# Patient Record
Sex: FEMALE | Race: BLACK OR AFRICAN AMERICAN | NOT HISPANIC OR LATINO | Employment: FULL TIME | URBAN - METROPOLITAN AREA
[De-identification: names, ages, dates, MRNs, and addresses within clinical notes are randomized per-mention and may not be internally consistent; named-entity substitution may affect disease eponyms.]

---

## 2019-08-13 ENCOUNTER — OFFICE VISIT (OUTPATIENT)
Dept: URGENT CARE | Facility: CLINIC | Age: 34
End: 2019-08-13
Payer: MEDICAID

## 2019-08-13 VITALS
TEMPERATURE: 99 F | WEIGHT: 177 LBS | HEART RATE: 101 BPM | RESPIRATION RATE: 16 BRPM | HEIGHT: 66 IN | DIASTOLIC BLOOD PRESSURE: 78 MMHG | OXYGEN SATURATION: 100 % | BODY MASS INDEX: 28.45 KG/M2 | SYSTOLIC BLOOD PRESSURE: 116 MMHG

## 2019-08-13 DIAGNOSIS — M54.50 ACUTE MIDLINE LOW BACK PAIN WITHOUT SCIATICA: Primary | ICD-10-CM

## 2019-08-13 PROCEDURE — 72100 XR LUMBAR SPINE AP AND LATERAL: ICD-10-PCS | Mod: S$GLB,,, | Performed by: RADIOLOGY

## 2019-08-13 PROCEDURE — 99203 OFFICE O/P NEW LOW 30 MIN: CPT | Mod: S$GLB,,, | Performed by: NURSE PRACTITIONER

## 2019-08-13 PROCEDURE — 72100 X-RAY EXAM L-S SPINE 2/3 VWS: CPT | Mod: S$GLB,,, | Performed by: RADIOLOGY

## 2019-08-13 PROCEDURE — 99203 PR OFFICE/OUTPT VISIT, NEW, LEVL III, 30-44 MIN: ICD-10-PCS | Mod: S$GLB,,, | Performed by: NURSE PRACTITIONER

## 2019-08-13 RX ORDER — DICLOFENAC SODIUM 75 MG/1
75 TABLET, DELAYED RELEASE ORAL 2 TIMES DAILY WITH MEALS
Qty: 60 TABLET | Refills: 0 | Status: SHIPPED | OUTPATIENT
Start: 2019-08-14 | End: 2019-09-10

## 2019-08-13 RX ORDER — KETOROLAC TROMETHAMINE 30 MG/ML
30 INJECTION, SOLUTION INTRAMUSCULAR; INTRAVENOUS
Status: COMPLETED | OUTPATIENT
Start: 2019-08-13 | End: 2019-08-13

## 2019-08-13 RX ORDER — METHOCARBAMOL 750 MG/1
750 TABLET, FILM COATED ORAL 4 TIMES DAILY
Qty: 40 TABLET | Refills: 0 | Status: SHIPPED | OUTPATIENT
Start: 2019-08-13 | End: 2019-08-23

## 2019-08-13 RX ADMIN — KETOROLAC TROMETHAMINE 30 MG: 30 INJECTION, SOLUTION INTRAMUSCULAR; INTRAVENOUS at 12:08

## 2019-08-13 NOTE — PROGRESS NOTES
"Subjective:       Patient ID: Faby Luz is a 33 y.o. female.    Vitals:  height is 5' 6" (1.676 m) and weight is 80.3 kg (177 lb). Her temperature is 98.8 °F (37.1 °C). Her blood pressure is 116/78 and her pulse is 101. Her respiration is 16 and oxygen saturation is 100%.     Chief Complaint: Back Pain    Pt states she notice lower back pain starting 3 weeks ago. Pt states the pain has gotten worse over the last couple of day.    Back Pain   This is a new problem. Episode onset: 3 weeks  The problem occurs constantly. The problem has been rapidly worsening since onset. The quality of the pain is described as aching. The pain does not radiate. The pain is at a severity of 9/10. The pain is severe. The pain is the same all the time. The symptoms are aggravated by sitting, lying down, position, bending, twisting, standing and urinating. Pertinent negatives include no abdominal pain, bladder incontinence, bowel incontinence, dysuria or numbness. She has tried nothing for the symptoms. The treatment provided no relief.       Constitution: Negative for fatigue.   Gastrointestinal: Negative for abdominal pain and bowel incontinence.   Genitourinary: Negative for dysuria, urgency, bladder incontinence and hematuria.   Musculoskeletal: Positive for back pain. Negative for muscle cramps and history of spine disorder.   Skin: Negative for rash.   Neurological: Negative for coordination disturbances, numbness and tingling.       Objective:      Physical Exam   Constitutional: She is oriented to person, place, and time. She appears well-developed and well-nourished. She is cooperative.  Non-toxic appearance. She does not appear ill. No distress.   HENT:   Head: Normocephalic and atraumatic.   Right Ear: Hearing, tympanic membrane, external ear and ear canal normal.   Left Ear: Hearing, tympanic membrane, external ear and ear canal normal.   Nose: Nose normal. No mucosal edema, rhinorrhea or nasal deformity. No epistaxis. " Right sinus exhibits no maxillary sinus tenderness and no frontal sinus tenderness. Left sinus exhibits no maxillary sinus tenderness and no frontal sinus tenderness.   Mouth/Throat: Uvula is midline, oropharynx is clear and moist and mucous membranes are normal. No trismus in the jaw. Normal dentition. No uvula swelling. No posterior oropharyngeal erythema.   Eyes: Conjunctivae and lids are normal. Right eye exhibits no discharge. Left eye exhibits no discharge. No scleral icterus.   Sclera clear bilat   Neck: Trachea normal, normal range of motion, full passive range of motion without pain and phonation normal. Neck supple.   Cardiovascular: Normal rate, regular rhythm, normal heart sounds, intact distal pulses and normal pulses.   Pulmonary/Chest: Effort normal and breath sounds normal. No respiratory distress.   Abdominal: Soft. Normal appearance and bowel sounds are normal. She exhibits no distension, no pulsatile midline mass and no mass. There is no tenderness.   Musculoskeletal: Normal range of motion. She exhibits no edema or deformity.        Lumbar back: She exhibits tenderness, pain and spasm. She exhibits no bony tenderness.   Neurological: She is alert and oriented to person, place, and time. She exhibits normal muscle tone. Coordination normal.   Skin: Skin is warm, dry and intact. She is not diaphoretic. No pallor.   Psychiatric: She has a normal mood and affect. Her speech is normal and behavior is normal. Judgment and thought content normal. Cognition and memory are normal.   Nursing note and vitals reviewed.      Assessment:       1. Acute midline low back pain without sciatica        Plan:         Acute midline low back pain without sciatica  -     ketorolac injection 30 mg  -     X-Ray Lumbar Spine Ap And Lateral; Future; Expected date: 08/13/2019  -     diclofenac (VOLTAREN) 75 MG EC tablet; Take 1 tablet (75 mg total) by mouth 2 (two) times daily with meals.  Dispense: 60 tablet; Refill: 0  -      methocarbamol (ROBAXIN) 750 MG Tab; Take 1 tablet (750 mg total) by mouth 4 (four) times daily. for 10 days  Dispense: 40 tablet; Refill: 0      Results for orders placed or performed during the hospital encounter of 08/06/13   POCT urine pregnancy   Result Value Ref Range    POC Preg Test, Ur Negative Negative     Acceptable Yes (none)     Please follow up with your Primary care provider within 2-5 days if your signs and symptoms have not resolved or worsen.     If your condition worsens or fails to improve we recommend that you receive another evaluation at the emergency room immediately or contact your primary medical clinic to discuss your concerns.   You must understand that you have received an Urgent Care treatment only and that you may be released before all of your medical problems are known or treated. You, the patient, will arrange for follow up care as instructed.     RED FLAGS/WARNING SYMPTOMS DISCUSSED WITH PATIENT THAT WOULD WARRANT EMERGENT MEDICAL ATTENTION. PATIENT VERBALIZED UNDERSTANDING.       Back Pain (Acute or Chronic)    Back pain is one of the most common problems. The good news is that most people feel better in 1 to 2 weeks, and most of the rest in 1 to 2 months. Most people can remain active.  People experience and describe pain differently; not everyone is the same.  · The pain can be sharp, stabbing, shooting, aching, cramping or burning.  · Movement, standing, bending, lifting, sitting, or walking may worsen pain.  · It can be localized to one spot or area, or it can be more generalized.  · It can spread or radiate upwards, to the front, or go down your arms or legs (sciatica).  · It can cause muscle spasm.  Most of the time, mechanical problems with the muscles or spine cause the pain. Mechanical problems are usually caused by an injury to the muscles or ligaments. While illness can cause back pain, it is usually not caused by a serious illness. Mechanical problems  include:   · Physical activity such as sports, exercise, work, or normal activity  · Overexertion, lifting, pushing, pulling incorrectly or too aggressively  · Sudden twisting, bending, or stretching from an accident, or accidental movement  · Poor posture  · Stretching or moving wrong, without noticing pain at the time  · Poor coordination, lack of regular exercise (check with your doctor about this)  · Spinal disc disease or arthritis  · Stress  Pain can also be related to pregnancy, or illness like appendicitis, bladder or kidney infections, pelvic infections, and many other things.  Acute back pain usually gets better in 1 to 2 weeks. Back pain related to disk disease, arthritis in the spinal joints or spinal stenosis (narrowing of the spinal canal) can become chronic and last for months or years.  Unless you had a physical injury (for example, a car accident or fall) X-rays are usually not needed for the initial evaluation of back pain. If pain continues and does not respond to medical treatment, X-rays and other tests may be needed.  Home care  Try these home care recommendations:  · When in bed, try to find a position of comfort. A firm mattress is best. Try lying flat on your back with pillows under your knees. You can also try lying on your side with your knees bent up towards your chest and a pillow between your knees.  · At first, do not try to stretch out the sore spots. If there is a strain, it is not like the good soreness you get after exercising without an injury. In this case, stretching may make it worse.  · Avoid prolong sitting, long car rides, or travel. This puts more stress on the lower back than standing or walking.  · During the first 24 to 72 hours after an acute injury or flare up of chronic back pain, apply an ice pack to the painful area for 20 minutes and then remove it for 20 minutes. Do this over a period of 60 to 90 minutes or several times a day. This will reduce swelling and pain.  Wrap the ice pack in a thin towel or plastic to protect your skin.  · You can start with ice, then switch to heat. Heat (hot shower, hot bath, or heating pad) reduces pain and works well for muscle spasms. Heat can be applied to the painful area for 20 minutes then remove it for 20 minutes. Do this over a period of 60 to 90 minutes or several times a day. Do not sleep on a heating pad. It can lead to skin burns or tissue damage.  · You can alternate ice and heat therapy. Talk with your doctor about the best treatment for your back pain.  · Therapeutic massage can help relax the back muscles without stretching them.  · Be aware of safe lifting methods and do not lift anything without stretching first.  Medicines  Talk to your doctor before using medicine, especially if you have other medical problems or are taking other medicines.  · You may use over-the-counter medicine as directed on the bottle to control pain, unless another pain medicine was prescribed. If you have chronic conditions like diabetes, liver or kidney disease, stomach ulcers, or gastrointestinal bleeding, or are taking blood thinners, talk to your doctor before taking any medicine.  · Be careful if you are given a prescription medicines, narcotics, or medicine for muscle spasms. They can cause drowsiness, affect your coordination, reflexes, and judgement. Do not drive or operate heavy machinery.  Follow-up care  Follow up with your healthcare provider, or as advised.   A radiologist will review any X-rays that were taken. Your provide will notify you of any new findings that may affect your care.  Call 911  Call emergency services if any of the following occur:  · Trouble breathing  · Confusion  · Very drowsy or trouble awakening  · Fainting or loss of consciousness  · Rapid or very slow heart rate  · Loss of bowel or bladder control  When to seek medical advice  Call your healthcare provider right away if any of these occur:   · Pain becomes worse  or spreads to your legs  · Weakness or numbness in one or both legs  · Numbness in the groin or genital area  Date Last Reviewed: 7/1/2016 © 2000-2017 [employer] [address] [place] [address], [place] [address]. All rights reserved. This information is not intended as a substitute for professional medical care. Always follow your healthcare professional's instructions.

## 2019-08-13 NOTE — PATIENT INSTRUCTIONS
Please follow up with your Primary care provider within 2-5 days if your signs and symptoms have not resolved or worsen.     If your condition worsens or fails to improve we recommend that you receive another evaluation at the emergency room immediately or contact your primary medical clinic to discuss your concerns.   You must understand that you have received an Urgent Care treatment only and that you may be released before all of your medical problems are known or treated. You, the patient, will arrange for follow up care as instructed.     RED FLAGS/WARNING SYMPTOMS DISCUSSED WITH PATIENT THAT WOULD WARRANT EMERGENT MEDICAL ATTENTION. PATIENT VERBALIZED UNDERSTANDING.       Back Pain (Acute or Chronic)    Back pain is one of the most common problems. The good news is that most people feel better in 1 to 2 weeks, and most of the rest in 1 to 2 months. Most people can remain active.  People experience and describe pain differently; not everyone is the same.  · The pain can be sharp, stabbing, shooting, aching, cramping or burning.  · Movement, standing, bending, lifting, sitting, or walking may worsen pain.  · It can be localized to one spot or area, or it can be more generalized.  · It can spread or radiate upwards, to the front, or go down your arms or legs (sciatica).  · It can cause muscle spasm.  Most of the time, mechanical problems with the muscles or spine cause the pain. Mechanical problems are usually caused by an injury to the muscles or ligaments. While illness can cause back pain, it is usually not caused by a serious illness. Mechanical problems include:   · Physical activity such as sports, exercise, work, or normal activity  · Overexertion, lifting, pushing, pulling incorrectly or too aggressively  · Sudden twisting, bending, or stretching from an accident, or accidental movement  · Poor posture  · Stretching or moving wrong, without noticing pain at the time  · Poor coordination, lack of regular  exercise (check with your doctor about this)  · Spinal disc disease or arthritis  · Stress  Pain can also be related to pregnancy, or illness like appendicitis, bladder or kidney infections, pelvic infections, and many other things.  Acute back pain usually gets better in 1 to 2 weeks. Back pain related to disk disease, arthritis in the spinal joints or spinal stenosis (narrowing of the spinal canal) can become chronic and last for months or years.  Unless you had a physical injury (for example, a car accident or fall) X-rays are usually not needed for the initial evaluation of back pain. If pain continues and does not respond to medical treatment, X-rays and other tests may be needed.  Home care  Try these home care recommendations:  · When in bed, try to find a position of comfort. A firm mattress is best. Try lying flat on your back with pillows under your knees. You can also try lying on your side with your knees bent up towards your chest and a pillow between your knees.  · At first, do not try to stretch out the sore spots. If there is a strain, it is not like the good soreness you get after exercising without an injury. In this case, stretching may make it worse.  · Avoid prolong sitting, long car rides, or travel. This puts more stress on the lower back than standing or walking.  · During the first 24 to 72 hours after an acute injury or flare up of chronic back pain, apply an ice pack to the painful area for 20 minutes and then remove it for 20 minutes. Do this over a period of 60 to 90 minutes or several times a day. This will reduce swelling and pain. Wrap the ice pack in a thin towel or plastic to protect your skin.  · You can start with ice, then switch to heat. Heat (hot shower, hot bath, or heating pad) reduces pain and works well for muscle spasms. Heat can be applied to the painful area for 20 minutes then remove it for 20 minutes. Do this over a period of 60 to 90 minutes or several times a day. Do  not sleep on a heating pad. It can lead to skin burns or tissue damage.  · You can alternate ice and heat therapy. Talk with your doctor about the best treatment for your back pain.  · Therapeutic massage can help relax the back muscles without stretching them.  · Be aware of safe lifting methods and do not lift anything without stretching first.  Medicines  Talk to your doctor before using medicine, especially if you have other medical problems or are taking other medicines.  · You may use over-the-counter medicine as directed on the bottle to control pain, unless another pain medicine was prescribed. If you have chronic conditions like diabetes, liver or kidney disease, stomach ulcers, or gastrointestinal bleeding, or are taking blood thinners, talk to your doctor before taking any medicine.  · Be careful if you are given a prescription medicines, narcotics, or medicine for muscle spasms. They can cause drowsiness, affect your coordination, reflexes, and judgement. Do not drive or operate heavy machinery.  Follow-up care  Follow up with your healthcare provider, or as advised.   A radiologist will review any X-rays that were taken. Your provide will notify you of any new findings that may affect your care.  Call 911  Call emergency services if any of the following occur:  · Trouble breathing  · Confusion  · Very drowsy or trouble awakening  · Fainting or loss of consciousness  · Rapid or very slow heart rate  · Loss of bowel or bladder control  When to seek medical advice  Call your healthcare provider right away if any of these occur:   · Pain becomes worse or spreads to your legs  · Weakness or numbness in one or both legs  · Numbness in the groin or genital area  Date Last Reviewed: 7/1/2016 © 2000-2017 [employer]. [address] [place] [address], [place] [address]. All rights reserved. This information is not intended as a substitute for professional medical care. Always follow your healthcare  professional's instructions.

## 2019-09-10 ENCOUNTER — OFFICE VISIT (OUTPATIENT)
Dept: OBSTETRICS AND GYNECOLOGY | Facility: CLINIC | Age: 34
End: 2019-09-10
Payer: MEDICAID

## 2019-09-10 VITALS
SYSTOLIC BLOOD PRESSURE: 108 MMHG | HEIGHT: 66 IN | WEIGHT: 170.88 LBS | BODY MASS INDEX: 27.46 KG/M2 | DIASTOLIC BLOOD PRESSURE: 66 MMHG

## 2019-09-10 DIAGNOSIS — Z30.09 FAMILY PLANNING: Primary | ICD-10-CM

## 2019-09-10 PROCEDURE — 99213 OFFICE O/P EST LOW 20 MIN: CPT | Mod: PBBFAC | Performed by: OBSTETRICS & GYNECOLOGY

## 2019-09-10 PROCEDURE — 99203 PR OFFICE/OUTPT VISIT, NEW, LEVL III, 30-44 MIN: ICD-10-PCS | Mod: S$PBB,,, | Performed by: OBSTETRICS & GYNECOLOGY

## 2019-09-10 PROCEDURE — 99203 OFFICE O/P NEW LOW 30 MIN: CPT | Mod: S$PBB,,, | Performed by: OBSTETRICS & GYNECOLOGY

## 2019-09-10 PROCEDURE — 99999 PR PBB SHADOW E&M-EST. PATIENT-LVL III: CPT | Mod: PBBFAC,,, | Performed by: OBSTETRICS & GYNECOLOGY

## 2019-09-10 PROCEDURE — 99999 PR PBB SHADOW E&M-EST. PATIENT-LVL III: ICD-10-PCS | Mod: PBBFAC,,, | Performed by: OBSTETRICS & GYNECOLOGY

## 2019-09-10 NOTE — LETTER
September 10, 2019      Jose STOCK Age, APRN  [address] [facility] [address]           [facility]  [address] [facility] [address]  [facility] [address]  Phone: [phone]          Patient: Faby Luz   MR Number: [id number]   YOB: 1985   Date of Visit: 9/10/2019       Dear Kurtiskaty STOCK Age:    Thank you for referring Faby Luz to me for evaluation. Attached you will find relevant portions of my assessment and plan of care.    If you have questions, please do not hesitate to call me. I look forward to following Faby Luz along with you.    Sincerely,    Jem Espinal MD    Enclosure  CC:  No Recipients    If you would like to receive this communication electronically, please contact [email or web address] or [phone] to request more information on [employer] Link access.    For providers and/or their staff who would like to refer a patient to [facility], please contact us through our one-stop-shop provider referral line, [facility], at [phone].    If you feel you have received this communication in error or would no longer like to receive these types of communications, please e-mail [email or web address]

## 2019-09-10 NOTE — PROGRESS NOTES
Subjective:       Patient ID: Faby Luz is a 33 y.o. female.    Chief Complaint:  Gynecologic Exam (NP here for pap and consult on tubal ligation)      History of Present Illness  HPI  Patient comes in today requesting tubal ligation/family planning  No complaint    Menses started today .  Does not want to be examined.      GYN & OB History  Patient's last menstrual period was 2019 (exact date).   Date of Last Pap: No result found    OB History    Para Term  AB Living   4 3 3   1 3   SAB TAB Ectopic Multiple Live Births           3      # Outcome Date GA Lbr Andry/2nd Weight Sex Delivery Anes PTL Lv   4 AB 19           3 Term 10/02/18 40w0d  3.629 kg (8 lb) M Vag-Spont EPI N JEANNETTE   2 Term 06/25/10 40w0d  3.459 kg (7 lb 10 oz) M Vag-Spont EPI N JEANNETTE   1 Term 07 40w0d  3.515 kg (7 lb 12 oz) M Vag-Spont  N JEANNETTE     History reviewed. No pertinent past medical history.    History reviewed. No pertinent surgical history.    Family History   Problem Relation Age of Onset    Diabetes Mother     Diabetes Maternal Grandmother     Hypertension Maternal Grandmother     Diabetes Maternal Aunt     Kidney disease Maternal Aunt        Social History     Socioeconomic History    Marital status: Single     Spouse name: Not on file    Number of children: Not on file    Years of education: Not on file    Highest education level: Not on file   Occupational History    Not on file   Social Needs    Financial resource strain: Not on file    Food insecurity:     Worry: Not on file     Inability: Not on file    Transportation needs:     Medical: Not on file     Non-medical: Not on file   Tobacco Use    Smoking status: Never Smoker    Smokeless tobacco: Never Used   Substance and Sexual Activity    Alcohol use: Yes     Comment: occ-social    Drug use: No    Sexual activity: Yes     Birth control/protection: None   Lifestyle    Physical activity:     Days per week: Not on file     Minutes per  session: Not on file    Stress: Not on file   Relationships    Social connections:     Talks on phone: Not on file     Gets together: Not on file     Attends [employer] service: Not on file     Active member of club or organization: Not on file     Attends meetings of clubs or organizations: Not on file     Relationship status: Not on file   Other Topics Concern    Not on file   Social History Narrative    Together since high school     2013,   2015.  Back together 2017.    He is a     She is a hotel exec.        No current outpatient medications on file.     No current facility-administered medications for this visit.        Review of patient's allergies indicates:  No Known Allergies    Review of Systems  Review of Systems   Constitutional: Negative for activity change, appetite change, chills, fatigue, fever and unexpected weight change.   HENT: Negative for mouth sores.    Respiratory: Negative for cough, shortness of breath and wheezing.    Cardiovascular: Negative for chest pain and palpitations.   Gastrointestinal: Negative for abdominal pain, bloating, blood in stool, constipation, nausea and vomiting.   Endocrine: Negative for diabetes and hot flashes.   Genitourinary: Negative for dysmenorrhea, dyspareunia, dysuria, frequency, hematuria, menorrhagia, menstrual problem, pelvic pain, urgency, vaginal bleeding, vaginal discharge, vaginal pain, urinary incontinence, postcoital bleeding and vaginal odor.   Musculoskeletal: Negative for back pain and myalgias.   Integumentary:  Negative for rash, breast mass and nipple discharge.   Neurological: Negative for seizures and headaches.   Psychiatric/Behavioral: Negative for depression and sleep disturbance. The patient is not nervous/anxious.    Breast: Negative for mass, mastodynia and nipple discharge          Objective:    Physical Exam:   Constitutional: She appears well-developed and well-nourished. No distress.    HENT:   Head:  Normocephalic and atraumatic.    Eyes: EOM are normal.    Neck: Normal range of motion.    Cardiovascular: Normal rate.     Pulmonary/Chest: Effort normal. No respiratory distress.                  Musculoskeletal: Normal range of motion.       Neurological: She is alert.    Skin: Skin is warm and dry.    Psychiatric: She has a normal mood and affect.          Assessment:        1. Family planning              Plan:      I have discussed with the patient regarding her contraceptive options.  Risks and benefits of all discussed including oral contraceptives, Depo-Provera, OrthoEvra, NuvaRing, Mirena/ParaGard, Implanon, sterilization. After extensive dicussion, the patient wishes to have the Mirena.  Risks and benefits again discussed.  All of her questions were answered appropriately to her satisfaction.        We will order the device  She will be back for insertion.    She also wants to discuss tubal ligation.  Medicaid tubal consent signed.

## 2019-09-20 ENCOUNTER — TELEPHONE (OUTPATIENT)
Dept: OBSTETRICS AND GYNECOLOGY | Facility: CLINIC | Age: 34
End: 2019-09-20

## 2019-09-20 NOTE — TELEPHONE ENCOUNTER
----- Message from Skyla Hampton sent at 9/20/2019  1:30 PM CDT -----  Contact: self  Type: Patient Call Back    Who called:self    What is the request in detail: Pt calling to see if IUD is in.    Can the clinic reply by [facility]?no    Would the patient rather a call back or a response via [facility]? call    Best call back number:[phone]

## 2019-09-20 NOTE — TELEPHONE ENCOUNTER
Explained to patient that her IUD is not in yet and the office will call to set it up when it comes in.

## 2019-09-23 ENCOUNTER — TELEPHONE (OUTPATIENT)
Dept: PHARMACY | Facility: CLINIC | Age: 34
End: 2019-09-23

## 2019-09-30 ENCOUNTER — TELEPHONE (OUTPATIENT)
Dept: OBSTETRICS AND GYNECOLOGY | Facility: CLINIC | Age: 34
End: 2019-09-30

## 2019-10-02 ENCOUNTER — PROCEDURE VISIT (OUTPATIENT)
Dept: OBSTETRICS AND GYNECOLOGY | Facility: CLINIC | Age: 34
End: 2019-10-02
Payer: MEDICAID

## 2019-10-02 VITALS
SYSTOLIC BLOOD PRESSURE: 120 MMHG | BODY MASS INDEX: 27.64 KG/M2 | WEIGHT: 172 LBS | DIASTOLIC BLOOD PRESSURE: 70 MMHG | HEIGHT: 66 IN

## 2019-10-02 DIAGNOSIS — Z30.430 ENCOUNTER FOR INSERTION OF MIRENA IUD: Primary | ICD-10-CM

## 2019-10-02 LAB
B-HCG UR QL: NEGATIVE
CTP QC/QA: YES

## 2019-10-02 PROCEDURE — 58300 INSERTION OF INTRAUTERINE DEVICE: ICD-10-PCS | Mod: S$PBB,,, | Performed by: OBSTETRICS & GYNECOLOGY

## 2019-10-02 PROCEDURE — 81025 URINE PREGNANCY TEST: CPT | Mod: PBBFAC | Performed by: OBSTETRICS & GYNECOLOGY

## 2019-10-02 PROCEDURE — 58300 INSERT INTRAUTERINE DEVICE: CPT | Mod: PBBFAC | Performed by: OBSTETRICS & GYNECOLOGY

## 2019-10-02 RX ADMIN — LEVONORGESTREL 1 INTRA UTERINE DEVICE: 52 INTRAUTERINE DEVICE INTRAUTERINE at 02:10

## 2019-10-02 NOTE — PROCEDURES
INSERTION OF INTRAUTERINE DEVICE  Date/Time: 10/2/2019 2:00 PM  Performed by: Jem Espinal MD  Authorized by: Jem Espinal MD     Consent:     Consent obtained:  Written    Consent given by:  Patient    Procedure risks and benefits discussed: yes      Patient questions answered: yes      Patient agrees, verbalizes understanding, and wants to proceed: yes      Educational handouts given: yes      Instructions and paperwork completed: yes    Procedure:     Pelvic exam performed: yes      Negative GC/chlamydia test: no      Negative urine pregnancy test: yes      Negative serum pregnancy test: no      Cervix cleaned and prepped: yes      Speculum placed in vagina: yes      Tenaculum applied to cervix: yes      Uterus sounded: yes      Uterus sound depth (cm):  8    IUD inserted with no complications: yes      Strings trimmed: yes    Post-procedure:     Patient tolerated procedure well: yes      Patient will follow up after next period: yes        We began our procedure by going over the risks and benefits of the IUD along with alternative methods of contraception.  All questions answered.  Consents signed.  The patient wished to proceed.    A clean speculum was placed into the vaginal vault.  Patient was not allergic to Betadine.  Vagina and cervix were then cleaned with Betadine including the cervical canal.  Single-tooth tenaculum was used to grasp anterior lip of the cervix. The uterus was sounded to 8 cm. The IUD, Mirena, previously shown to the patient, was then placed to the depth of the fundus without any difficulty in the standard manner.  Strings were then cut using Castro scissors to a length of 1.5 inches.  All instruments were then removed.  Silver nitrate was used to obtain hemostasis at the tenaculum site.    Patient tolerated the procedure well without any complications.  Post-procedure pain was rated at 2/10.  Educational material given.    Patient was told to take over-the-counter Motrin, 2-3 tablets  every 6 hours as needed for pain. She will refrain from having intercourse for the next 2 weeks.    Patient will be back for follow-up in 4 weeks.    Lot number: [id number]  Expiration date: January 2022

## 2019-12-09 ENCOUNTER — OFFICE VISIT (OUTPATIENT)
Dept: OBSTETRICS AND GYNECOLOGY | Facility: CLINIC | Age: 34
End: 2019-12-09
Payer: COMMERCIAL

## 2019-12-09 VITALS
WEIGHT: 178.56 LBS | HEIGHT: 66 IN | SYSTOLIC BLOOD PRESSURE: 102 MMHG | BODY MASS INDEX: 28.7 KG/M2 | DIASTOLIC BLOOD PRESSURE: 60 MMHG

## 2019-12-09 DIAGNOSIS — Z30.431 IUD CHECK UP: Primary | ICD-10-CM

## 2019-12-09 DIAGNOSIS — N89.8 VAGINAL DISCHARGE: ICD-10-CM

## 2019-12-09 PROCEDURE — 3008F PR BODY MASS INDEX (BMI) DOCUMENTED: ICD-10-PCS | Mod: CPTII,S$GLB,, | Performed by: OBSTETRICS & GYNECOLOGY

## 2019-12-09 PROCEDURE — 99213 PR OFFICE/OUTPT VISIT, EST, LEVL III, 20-29 MIN: ICD-10-PCS | Mod: S$GLB,,, | Performed by: OBSTETRICS & GYNECOLOGY

## 2019-12-09 PROCEDURE — 99213 OFFICE O/P EST LOW 20 MIN: CPT | Mod: S$GLB,,, | Performed by: OBSTETRICS & GYNECOLOGY

## 2019-12-09 PROCEDURE — 99999 PR PBB SHADOW E&M-EST. PATIENT-LVL III: ICD-10-PCS | Mod: PBBFAC,,, | Performed by: OBSTETRICS & GYNECOLOGY

## 2019-12-09 PROCEDURE — 99999 PR PBB SHADOW E&M-EST. PATIENT-LVL III: CPT | Mod: PBBFAC,,, | Performed by: OBSTETRICS & GYNECOLOGY

## 2019-12-09 PROCEDURE — 3008F BODY MASS INDEX DOCD: CPT | Mod: CPTII,S$GLB,, | Performed by: OBSTETRICS & GYNECOLOGY

## 2019-12-09 NOTE — PROGRESS NOTES
Subjective:       Patient ID: Faby Luz is a 34 y.o. female.    Chief Complaint:  IUD Check (Follow up IUD- Mirena insertion on 10/02/19)      History of Present Illness  HPI  Patient comes in today for follow-up.  Status post Mirena insertion on 10/2/2019.   Has resumed normal intercourse without any difficulty.    Has had some discharge with odor.  Would like to know if there are treatment options.  Not sure if she has bacterial vaginosis.      GYN & OB History  Patient's last menstrual period was 2019 (exact date).   Date of Last Pap: No result found    OB History    Para Term  AB Living   4 3 3   1 3   SAB TAB Ectopic Multiple Live Births           3      # Outcome Date GA Lbr Andry/2nd Weight Sex Delivery Anes PTL Lv   4 AB 19           3 Term 10/02/18 40w0d  3.629 kg (8 lb) M Vag-Spont EPI N JEANNETTE   2 Term 06/25/10 40w0d  3.459 kg (7 lb 10 oz) M Vag-Spont EPI N JEANNETTE   1 Term 07 40w0d  3.515 kg (7 lb 12 oz) M Vag-Spont  N JEANNETTE     History reviewed. No pertinent past medical history.    History reviewed. No pertinent surgical history.    Family History   Problem Relation Age of Onset    Diabetes Mother     Diabetes Maternal Grandmother     Hypertension Maternal Grandmother     Diabetes Maternal Aunt     Kidney disease Maternal Aunt        Social History     Socioeconomic History    Marital status: Single     Spouse name: Not on file    Number of children: Not on file    Years of education: Not on file    Highest education level: Not on file   Occupational History    Not on file   Social Needs    Financial resource strain: Not on file    Food insecurity:     Worry: Not on file     Inability: Not on file    Transportation needs:     Medical: Not on file     Non-medical: Not on file   Tobacco Use    Smoking status: Never Smoker    Smokeless tobacco: Never Used   Substance and Sexual Activity    Alcohol use: Yes     Comment: occ-social    Drug use: No    Sexual  activity: Yes     Birth control/protection: None   Lifestyle    Physical activity:     Days per week: Not on file     Minutes per session: Not on file    Stress: Not on file   Relationships    Social connections:     Talks on phone: Not on file     Gets together: Not on file     Attends Buddhist service: Not on file     Active member of club or organization: Not on file     Attends meetings of clubs or organizations: Not on file     Relationship status: Not on file   Other Topics Concern    Not on file   Social History Narrative    Together since high school     2013,   2015.  Back together 2017.    He is a     She is a hotel exec.        Current Outpatient Medications   Medication Sig Dispense Refill    levonorgestrel (MIRENA) 20 mcg/24 hours (5 yrs) 52 mg IUD 1 Intra Uterine Device by Intrauterine route once. for 1 dose 1 Intra Uterine Device 0     Current Facility-Administered Medications   Medication Dose Route Frequency Provider Last Rate Last Dose    levonorgestrel 20 mcg/24 hours (5 yrs) 52 mg IUD 1 Intra Uterine Device  1 Intra Uterine Device Intrauterine  Jem Espinal MD   1 Intra Uterine Device at 10/02/19 1400       Review of patient's allergies indicates:  No Known Allergies    Review of Systems  Review of Systems   Constitutional: Negative for activity change, appetite change, chills, fatigue, fever and unexpected weight change.   HENT: Negative for mouth sores.    Respiratory: Negative for cough, shortness of breath and wheezing.    Cardiovascular: Negative for chest pain and palpitations.   Gastrointestinal: Negative for abdominal pain, bloating, blood in stool, constipation, nausea and vomiting.   Endocrine: Negative for diabetes and hot flashes.   Genitourinary: Positive for vaginal discharge and vaginal odor. Negative for dysmenorrhea, dyspareunia, dysuria, frequency, hematuria, menorrhagia, menstrual problem, pelvic pain, urgency, vaginal bleeding, vaginal pain,  urinary incontinence and postcoital bleeding.   Musculoskeletal: Negative for back pain and myalgias.   Integumentary:  Negative for rash, breast mass and nipple discharge.   Neurological: Negative for seizures and headaches.   Psychiatric/Behavioral: Negative for depression and sleep disturbance. The patient is not nervous/anxious.    Breast: Negative for mass, mastodynia and nipple discharge          Objective:    Physical Exam:   Constitutional: She appears well-developed and well-nourished. No distress.    HENT:   Head: Normocephalic and atraumatic.    Eyes: EOM are normal.    Neck: Normal range of motion.     Pulmonary/Chest: Effort normal. No respiratory distress.        Abdominal: Soft. She exhibits no distension. There is no tenderness. There is no rebound and no guarding.     Genitourinary: Vagina normal and uterus normal. No vaginal discharge found.   Genitourinary Comments: Vulva without any obvious lesions.  Urethral meatus normal size and location without any lesion.  Urethra is non-tender without stricture or discharge.  Bladder is non-tender.  Vaginal vault with good support.  Minimal white discharge noted.  No obvious lesion.  Normal rugation.  Cervix is without any cervical motion tenderness.  No obvious lesion.  Mirena strings visible.  Uterus is small, non-tender, normal contour.  Adnexa is without any masses or tenderness.  Perineum without obvious lesion.             Musculoskeletal: Normal range of motion.       Neurological: She is alert.    Skin: Skin is warm and dry.    Psychiatric: She has a normal mood and affect.          Assessment:        1. IUD check up    2. Vaginal discharge              Plan:      I have discussed with the patient regarding her condition  BAS as needed  Back in one year for annual visit.

## 2021-04-15 ENCOUNTER — PATIENT MESSAGE (OUTPATIENT)
Dept: RESEARCH | Facility: HOSPITAL | Age: 36
End: 2021-04-15

## 2021-09-17 ENCOUNTER — OFFICE VISIT (OUTPATIENT)
Dept: OBSTETRICS AND GYNECOLOGY | Facility: CLINIC | Age: 36
End: 2021-09-17
Payer: COMMERCIAL

## 2021-09-17 VITALS
BODY MASS INDEX: 31.2 KG/M2 | DIASTOLIC BLOOD PRESSURE: 82 MMHG | SYSTOLIC BLOOD PRESSURE: 114 MMHG | WEIGHT: 194.13 LBS | HEIGHT: 66 IN

## 2021-09-17 DIAGNOSIS — Z01.419 WELL WOMAN EXAM WITH ROUTINE GYNECOLOGICAL EXAM: Primary | ICD-10-CM

## 2021-09-17 DIAGNOSIS — N89.8 VAGINAL DISCHARGE: ICD-10-CM

## 2021-09-17 DIAGNOSIS — Z30.09 UNWANTED FERTILITY: ICD-10-CM

## 2021-09-17 PROCEDURE — 87624 HPV HI-RISK TYP POOLED RSLT: CPT | Performed by: OBSTETRICS & GYNECOLOGY

## 2021-09-17 PROCEDURE — 1160F PR REVIEW ALL MEDS BY PRESCRIBER/CLIN PHARMACIST DOCUMENTED: ICD-10-PCS | Mod: CPTII,S$GLB,, | Performed by: OBSTETRICS & GYNECOLOGY

## 2021-09-17 PROCEDURE — 1159F PR MEDICATION LIST DOCUMENTED IN MEDICAL RECORD: ICD-10-PCS | Mod: CPTII,S$GLB,, | Performed by: OBSTETRICS & GYNECOLOGY

## 2021-09-17 PROCEDURE — 87591 N.GONORRHOEAE DNA AMP PROB: CPT | Performed by: OBSTETRICS & GYNECOLOGY

## 2021-09-17 PROCEDURE — 1160F RVW MEDS BY RX/DR IN RCRD: CPT | Mod: CPTII,S$GLB,, | Performed by: OBSTETRICS & GYNECOLOGY

## 2021-09-17 PROCEDURE — 99999 PR PBB SHADOW E&M-EST. PATIENT-LVL III: CPT | Mod: PBBFAC,,, | Performed by: OBSTETRICS & GYNECOLOGY

## 2021-09-17 PROCEDURE — 3074F PR MOST RECENT SYSTOLIC BLOOD PRESSURE < 130 MM HG: ICD-10-PCS | Mod: CPTII,S$GLB,, | Performed by: OBSTETRICS & GYNECOLOGY

## 2021-09-17 PROCEDURE — 3074F SYST BP LT 130 MM HG: CPT | Mod: CPTII,S$GLB,, | Performed by: OBSTETRICS & GYNECOLOGY

## 2021-09-17 PROCEDURE — 87481 CANDIDA DNA AMP PROBE: CPT | Mod: 59 | Performed by: OBSTETRICS & GYNECOLOGY

## 2021-09-17 PROCEDURE — 1159F MED LIST DOCD IN RCRD: CPT | Mod: CPTII,S$GLB,, | Performed by: OBSTETRICS & GYNECOLOGY

## 2021-09-17 PROCEDURE — 99999 PR PBB SHADOW E&M-EST. PATIENT-LVL III: ICD-10-PCS | Mod: PBBFAC,,, | Performed by: OBSTETRICS & GYNECOLOGY

## 2021-09-17 PROCEDURE — 99395 PREV VISIT EST AGE 18-39: CPT | Mod: S$GLB,,, | Performed by: OBSTETRICS & GYNECOLOGY

## 2021-09-17 PROCEDURE — 3008F PR BODY MASS INDEX (BMI) DOCUMENTED: ICD-10-PCS | Mod: CPTII,S$GLB,, | Performed by: OBSTETRICS & GYNECOLOGY

## 2021-09-17 PROCEDURE — 3079F PR MOST RECENT DIASTOLIC BLOOD PRESSURE 80-89 MM HG: ICD-10-PCS | Mod: CPTII,S$GLB,, | Performed by: OBSTETRICS & GYNECOLOGY

## 2021-09-17 PROCEDURE — 3079F DIAST BP 80-89 MM HG: CPT | Mod: CPTII,S$GLB,, | Performed by: OBSTETRICS & GYNECOLOGY

## 2021-09-17 PROCEDURE — 3008F BODY MASS INDEX DOCD: CPT | Mod: CPTII,S$GLB,, | Performed by: OBSTETRICS & GYNECOLOGY

## 2021-09-17 PROCEDURE — 87491 CHLMYD TRACH DNA AMP PROBE: CPT | Mod: 59 | Performed by: OBSTETRICS & GYNECOLOGY

## 2021-09-17 PROCEDURE — 88175 CYTOPATH C/V AUTO FLUID REDO: CPT | Performed by: OBSTETRICS & GYNECOLOGY

## 2021-09-17 PROCEDURE — 99395 PR PREVENTIVE VISIT,EST,18-39: ICD-10-PCS | Mod: S$GLB,,, | Performed by: OBSTETRICS & GYNECOLOGY

## 2021-09-17 RX ORDER — METRONIDAZOLE 7.5 MG/G
1 GEL VAGINAL NIGHTLY
Qty: 70 G | Refills: 0 | Status: SHIPPED | OUTPATIENT
Start: 2021-09-17 | End: 2021-09-22

## 2021-09-20 LAB
C TRACH DNA SPEC QL NAA+PROBE: NOT DETECTED
N GONORRHOEA DNA SPEC QL NAA+PROBE: NOT DETECTED

## 2021-09-23 LAB
BACTERIAL VAGINOSIS DNA: POSITIVE
CANDIDA GLABRATA DNA: NEGATIVE
CANDIDA KRUSEI DNA: NEGATIVE
CANDIDA RRNA VAG QL PROBE: NEGATIVE
FINAL PATHOLOGIC DIAGNOSIS: NORMAL
Lab: NORMAL
T VAGINALIS RRNA GENITAL QL PROBE: NEGATIVE

## 2021-09-24 DIAGNOSIS — N76.0 BV (BACTERIAL VAGINOSIS): Primary | ICD-10-CM

## 2021-09-24 DIAGNOSIS — B96.89 BV (BACTERIAL VAGINOSIS): Primary | ICD-10-CM

## 2021-09-24 RX ORDER — METRONIDAZOLE 500 MG/1
500 TABLET ORAL EVERY 12 HOURS
Qty: 14 TABLET | Refills: 0 | Status: SHIPPED | OUTPATIENT
Start: 2021-09-24 | End: 2021-10-01

## 2021-09-27 LAB
HPV HR 12 DNA SPEC QL NAA+PROBE: NEGATIVE
HPV16 AG SPEC QL: NEGATIVE
HPV18 DNA SPEC QL NAA+PROBE: NEGATIVE

## 2021-11-10 ENCOUNTER — OFFICE VISIT (OUTPATIENT)
Dept: FAMILY MEDICINE | Facility: CLINIC | Age: 36
End: 2021-11-10
Payer: COMMERCIAL

## 2021-11-10 VITALS
SYSTOLIC BLOOD PRESSURE: 126 MMHG | BODY MASS INDEX: 32.22 KG/M2 | HEART RATE: 89 BPM | WEIGHT: 200.5 LBS | DIASTOLIC BLOOD PRESSURE: 72 MMHG | HEIGHT: 66 IN | TEMPERATURE: 98 F | OXYGEN SATURATION: 98 %

## 2021-11-10 DIAGNOSIS — Z01.818 PREOPERATIVE CLEARANCE: Primary | ICD-10-CM

## 2021-11-10 DIAGNOSIS — Z02.89 ENCOUNTER FOR COMPLETION OF FORM WITH PATIENT: ICD-10-CM

## 2021-11-10 PROCEDURE — 3078F DIAST BP <80 MM HG: CPT | Mod: CPTII,S$GLB,, | Performed by: STUDENT IN AN ORGANIZED HEALTH CARE EDUCATION/TRAINING PROGRAM

## 2021-11-10 PROCEDURE — 3008F PR BODY MASS INDEX (BMI) DOCUMENTED: ICD-10-PCS | Mod: CPTII,S$GLB,, | Performed by: STUDENT IN AN ORGANIZED HEALTH CARE EDUCATION/TRAINING PROGRAM

## 2021-11-10 PROCEDURE — 3074F SYST BP LT 130 MM HG: CPT | Mod: CPTII,S$GLB,, | Performed by: STUDENT IN AN ORGANIZED HEALTH CARE EDUCATION/TRAINING PROGRAM

## 2021-11-10 PROCEDURE — 99999 PR PBB SHADOW E&M-EST. PATIENT-LVL III: CPT | Mod: PBBFAC,,, | Performed by: STUDENT IN AN ORGANIZED HEALTH CARE EDUCATION/TRAINING PROGRAM

## 2021-11-10 PROCEDURE — 1159F MED LIST DOCD IN RCRD: CPT | Mod: CPTII,S$GLB,, | Performed by: STUDENT IN AN ORGANIZED HEALTH CARE EDUCATION/TRAINING PROGRAM

## 2021-11-10 PROCEDURE — 3074F PR MOST RECENT SYSTOLIC BLOOD PRESSURE < 130 MM HG: ICD-10-PCS | Mod: CPTII,S$GLB,, | Performed by: STUDENT IN AN ORGANIZED HEALTH CARE EDUCATION/TRAINING PROGRAM

## 2021-11-10 PROCEDURE — 1159F PR MEDICATION LIST DOCUMENTED IN MEDICAL RECORD: ICD-10-PCS | Mod: CPTII,S$GLB,, | Performed by: STUDENT IN AN ORGANIZED HEALTH CARE EDUCATION/TRAINING PROGRAM

## 2021-11-10 PROCEDURE — 99213 PR OFFICE/OUTPT VISIT, EST, LEVL III, 20-29 MIN: ICD-10-PCS | Mod: S$GLB,,, | Performed by: STUDENT IN AN ORGANIZED HEALTH CARE EDUCATION/TRAINING PROGRAM

## 2021-11-10 PROCEDURE — 3008F BODY MASS INDEX DOCD: CPT | Mod: CPTII,S$GLB,, | Performed by: STUDENT IN AN ORGANIZED HEALTH CARE EDUCATION/TRAINING PROGRAM

## 2021-11-10 PROCEDURE — 99999 PR PBB SHADOW E&M-EST. PATIENT-LVL III: ICD-10-PCS | Mod: PBBFAC,,, | Performed by: STUDENT IN AN ORGANIZED HEALTH CARE EDUCATION/TRAINING PROGRAM

## 2021-11-10 PROCEDURE — 99213 OFFICE O/P EST LOW 20 MIN: CPT | Mod: S$GLB,,, | Performed by: STUDENT IN AN ORGANIZED HEALTH CARE EDUCATION/TRAINING PROGRAM

## 2021-11-10 PROCEDURE — 3078F PR MOST RECENT DIASTOLIC BLOOD PRESSURE < 80 MM HG: ICD-10-PCS | Mod: CPTII,S$GLB,, | Performed by: STUDENT IN AN ORGANIZED HEALTH CARE EDUCATION/TRAINING PROGRAM

## 2021-11-10 RX ORDER — PHENTERMINE HYDROCHLORIDE 37.5 MG/1
37.5 TABLET ORAL DAILY
COMMUNITY
Start: 2021-05-25

## 2022-02-02 ENCOUNTER — TELEPHONE (OUTPATIENT)
Dept: FAMILY MEDICINE | Facility: CLINIC | Age: 37
End: 2022-02-02
Payer: COMMERCIAL

## 2022-03-16 DIAGNOSIS — Z11.59 NEED FOR HEPATITIS C SCREENING TEST: ICD-10-CM

## 2023-04-14 ENCOUNTER — OFFICE VISIT (OUTPATIENT)
Dept: OBSTETRICS AND GYNECOLOGY | Facility: CLINIC | Age: 38
End: 2023-04-14
Payer: COMMERCIAL

## 2023-04-14 VITALS
BODY MASS INDEX: 33.72 KG/M2 | SYSTOLIC BLOOD PRESSURE: 124 MMHG | WEIGHT: 208.88 LBS | DIASTOLIC BLOOD PRESSURE: 70 MMHG

## 2023-04-14 DIAGNOSIS — Z01.419 WELL WOMAN EXAM WITH ROUTINE GYNECOLOGICAL EXAM: Primary | ICD-10-CM

## 2023-04-14 DIAGNOSIS — N89.8 VAGINAL DISCHARGE: ICD-10-CM

## 2023-04-14 PROCEDURE — 99395 PREV VISIT EST AGE 18-39: CPT | Mod: S$GLB,,, | Performed by: OBSTETRICS & GYNECOLOGY

## 2023-04-14 PROCEDURE — 99999 PR PBB SHADOW E&M-EST. PATIENT-LVL III: CPT | Mod: PBBFAC,,, | Performed by: OBSTETRICS & GYNECOLOGY

## 2023-04-14 PROCEDURE — 87591 N.GONORRHOEAE DNA AMP PROB: CPT | Performed by: OBSTETRICS & GYNECOLOGY

## 2023-04-14 PROCEDURE — 81514 NFCT DS BV&VAGINITIS DNA ALG: CPT | Performed by: OBSTETRICS & GYNECOLOGY

## 2023-04-14 PROCEDURE — 1160F RVW MEDS BY RX/DR IN RCRD: CPT | Mod: CPTII,S$GLB,, | Performed by: OBSTETRICS & GYNECOLOGY

## 2023-04-14 PROCEDURE — 1159F PR MEDICATION LIST DOCUMENTED IN MEDICAL RECORD: ICD-10-PCS | Mod: CPTII,S$GLB,, | Performed by: OBSTETRICS & GYNECOLOGY

## 2023-04-14 PROCEDURE — 99395 PR PREVENTIVE VISIT,EST,18-39: ICD-10-PCS | Mod: S$GLB,,, | Performed by: OBSTETRICS & GYNECOLOGY

## 2023-04-14 PROCEDURE — 1160F PR REVIEW ALL MEDS BY PRESCRIBER/CLIN PHARMACIST DOCUMENTED: ICD-10-PCS | Mod: CPTII,S$GLB,, | Performed by: OBSTETRICS & GYNECOLOGY

## 2023-04-14 PROCEDURE — 3008F BODY MASS INDEX DOCD: CPT | Mod: CPTII,S$GLB,, | Performed by: OBSTETRICS & GYNECOLOGY

## 2023-04-14 PROCEDURE — 3074F SYST BP LT 130 MM HG: CPT | Mod: CPTII,S$GLB,, | Performed by: OBSTETRICS & GYNECOLOGY

## 2023-04-14 PROCEDURE — 3008F PR BODY MASS INDEX (BMI) DOCUMENTED: ICD-10-PCS | Mod: CPTII,S$GLB,, | Performed by: OBSTETRICS & GYNECOLOGY

## 2023-04-14 PROCEDURE — 99999 PR PBB SHADOW E&M-EST. PATIENT-LVL III: ICD-10-PCS | Mod: PBBFAC,,, | Performed by: OBSTETRICS & GYNECOLOGY

## 2023-04-14 PROCEDURE — 3078F PR MOST RECENT DIASTOLIC BLOOD PRESSURE < 80 MM HG: ICD-10-PCS | Mod: CPTII,S$GLB,, | Performed by: OBSTETRICS & GYNECOLOGY

## 2023-04-14 PROCEDURE — 1159F MED LIST DOCD IN RCRD: CPT | Mod: CPTII,S$GLB,, | Performed by: OBSTETRICS & GYNECOLOGY

## 2023-04-14 PROCEDURE — 3074F PR MOST RECENT SYSTOLIC BLOOD PRESSURE < 130 MM HG: ICD-10-PCS | Mod: CPTII,S$GLB,, | Performed by: OBSTETRICS & GYNECOLOGY

## 2023-04-14 PROCEDURE — 3078F DIAST BP <80 MM HG: CPT | Mod: CPTII,S$GLB,, | Performed by: OBSTETRICS & GYNECOLOGY

## 2023-04-14 RX ORDER — METRONIDAZOLE 7.5 MG/G
1 GEL VAGINAL NIGHTLY
Qty: 70 G | Refills: 0 | Status: SHIPPED | OUTPATIENT
Start: 2023-04-14 | End: 2023-04-19

## 2023-04-14 NOTE — PROGRESS NOTES
History & Physical  Gynecology      SUBJECTIVE:     Chief Complaint: Annual Exam       History of Present Illness:  Annual Exam-Premenopausal  Ms. Luz is a 36 y/o female who presents for annual exam. The patient complains of vaginal discharge and odor. The patient is sexually active. GYN screening history: last pap: approximate date  and was normal. The patient wears seatbelts: yes. The patient participates in regular exercise: no. Has the patient ever been transfused or tattooed?: not asked. The patient reports that there is not domestic violence in her life.      Review of patient's allergies indicates:  No Known Allergies    History reviewed. No pertinent past medical history.  History reviewed. No pertinent surgical history.  OB History          4    Para   3    Term   3            AB   1    Living   3         SAB        IAB        Ectopic        Multiple        Live Births   3               Family History   Problem Relation Age of Onset    Diabetes Mother     Diabetes Maternal Grandmother     Hypertension Maternal Grandmother     Diabetes Maternal Aunt     Kidney disease Maternal Aunt      Social History     Tobacco Use    Smoking status: Never    Smokeless tobacco: Never   Substance Use Topics    Alcohol use: Yes     Comment: occ-social    Drug use: No       Current Outpatient Medications   Medication Sig    phentermine (ADIPEX-P) 37.5 mg tablet Take 37.5 mg by mouth once daily.    levonorgestreL (MIRENA) 20 mcg/24 hours (7 yrs) 52 mg IUD 1 Intra Uterine Device by Intrauterine route once. for 1 dose    metroNIDAZOLE (METROGEL) 0.75 % (37.5mg/5 gram) vaginal gel Place 1 applicator vaginally every evening. for 5 days     Current Facility-Administered Medications   Medication    levonorgestrel 20 mcg/24 hours (5 yrs) 52 mg IUD 1 Intra Uterine Device         Review of Systems:  Review of Systems   Constitutional:  Negative for chills and fever.   Eyes:  Negative for visual disturbance.    Respiratory:  Negative for cough and wheezing.    Cardiovascular:  Negative for chest pain and palpitations.   Gastrointestinal:  Negative for abdominal pain, nausea and vomiting.   Genitourinary:  Positive for vaginal discharge and vaginal odor. Negative for dysuria, frequency, hematuria, pelvic pain, vaginal bleeding and vaginal pain.   Neurological:  Negative for headaches.   Psychiatric/Behavioral:  Negative for depression.       OBJECTIVE:     Physical Exam:  Physical Exam  Vitals and nursing note reviewed. Exam conducted with a chaperone present.   Constitutional:       Appearance: She is well-developed.   Cardiovascular:      Rate and Rhythm: Normal rate.   Pulmonary:      Effort: Pulmonary effort is normal. No respiratory distress.   Chest:   Breasts:     Breasts are symmetrical.   Abdominal:      General: There is no distension.      Palpations: Abdomen is soft.      Tenderness: There is no abdominal tenderness.   Genitourinary:     Vagina: Vaginal discharge present.   Skin:     General: Skin is warm and dry.   Neurological:      Mental Status: She is alert and oriented to person, place, and time.         ASSESSMENT:       ICD-10-CM ICD-9-CM    1. Well woman exam with routine gynecological exam  Z01.419 V72.31       2. Vaginal discharge  N89.8 623.5 metroNIDAZOLE (METROGEL) 0.75 % (37.5mg/5 gram) vaginal gel      Vaginosis Screen by DNA Probe      C. trachomatis/N. gonorrhoeae by AMP DNA         Plan:      Faby was seen today for annual exam.    Diagnoses and all orders for this visit:    Well woman exam with routine gynecological exam  - Pap smear normal 2021    Vaginal discharge  -     metroNIDAZOLE (METROGEL) 0.75 % (37.5mg/5 gram) vaginal gel; Place 1 applicator vaginally every evening. for 5 days  -     Vaginosis Screen by DNA Probe  -     C. trachomatis/N. gonorrhoeae by AMP DNA  - Discussed with patient how douching/body wash/scented soaps/bubble baths can shift her vaginal oleksandr and natural  vaginal pH which can cause overgrowth of yeast or gardnerella which is the bacteria that causes BV. Discussed with patient to use only mild, unscented soaps such as Dove unscented and Dial and water to wash her vagina.     Orders Placed This Encounter   Procedures    Vaginosis Screen by DNA Probe    C. trachomatis/N. gonorrhoeae by AMP DNA       Follow up in about 1 year (around 4/14/2024).    Counseling time: 15 minutes    Cherry Ortiz

## 2023-04-16 LAB
C TRACH DNA SPEC QL NAA+PROBE: NOT DETECTED
N GONORRHOEA DNA SPEC QL NAA+PROBE: NOT DETECTED

## 2023-04-17 LAB
BACTERIAL VAGINOSIS DNA: POSITIVE
CANDIDA GLABRATA DNA: NEGATIVE
CANDIDA KRUSEI DNA: NEGATIVE
CANDIDA RRNA VAG QL PROBE: NEGATIVE
T VAGINALIS RRNA GENITAL QL PROBE: NEGATIVE

## 2023-04-18 DIAGNOSIS — N76.0 BV (BACTERIAL VAGINOSIS): Primary | ICD-10-CM

## 2023-04-18 DIAGNOSIS — B96.89 BV (BACTERIAL VAGINOSIS): Primary | ICD-10-CM

## 2023-06-22 ENCOUNTER — DOCUMENTATION ONLY (OUTPATIENT)
Dept: OBSTETRICS AND GYNECOLOGY | Facility: CLINIC | Age: 38
End: 2023-06-22
Payer: COMMERCIAL

## 2023-06-22 DIAGNOSIS — N89.8 VAGINAL DISCHARGE: Primary | ICD-10-CM

## 2023-06-22 RX ORDER — METRONIDAZOLE 500 MG/1
500 TABLET ORAL EVERY 12 HOURS
Qty: 28 TABLET | Refills: 0 | Status: SHIPPED | OUTPATIENT
Start: 2023-06-22 | End: 2023-10-18

## 2023-06-22 RX ORDER — FLUCONAZOLE 150 MG/1
150 TABLET ORAL
Qty: 6 TABLET | Refills: 0 | Status: SHIPPED | OUTPATIENT
Start: 2023-06-22

## 2023-06-22 NOTE — PROGRESS NOTES
Pt has been notified that Dr Franklin will send Metro gel and Diflucan to the [facility] in [place]. Pt voiced understanding.

## 2023-06-22 NOTE — PROGRESS NOTES
Pt called office requesting refill of diflucan and flagyl for vaginal discharge.  Hygiene advice.

## 2023-07-03 ENCOUNTER — TELEPHONE (OUTPATIENT)
Dept: OBSTETRICS AND GYNECOLOGY | Facility: CLINIC | Age: 38
End: 2023-07-03
Payer: COMMERCIAL

## 2023-07-05 ENCOUNTER — TELEPHONE (OUTPATIENT)
Dept: OBSTETRICS AND GYNECOLOGY | Facility: CLINIC | Age: 38
End: 2023-07-05
Payer: COMMERCIAL

## 2023-07-05 NOTE — TELEPHONE ENCOUNTER
----- Message from Daylin Gonzales sent at 7/5/2023  3:02 PM CDT -----  .Type:  Patient Returning Call    Who Called: Self     Who Left Message for Patient: Vonda Vlad    Does the patient know what this is regarding?: No     Would the patient rather a call back or a response via [facility]? Call Back     Best Call Back Number:[phone]    Additional Information:

## 2023-07-31 ENCOUNTER — PATIENT MESSAGE (OUTPATIENT)
Dept: RESEARCH | Facility: HOSPITAL | Age: 38
End: 2023-07-31
Payer: COMMERCIAL

## 2023-10-18 ENCOUNTER — OFFICE VISIT (OUTPATIENT)
Dept: OBSTETRICS AND GYNECOLOGY | Facility: CLINIC | Age: 38
End: 2023-10-18
Payer: COMMERCIAL

## 2023-10-18 VITALS
HEIGHT: 66 IN | BODY MASS INDEX: 32.38 KG/M2 | WEIGHT: 201.5 LBS | SYSTOLIC BLOOD PRESSURE: 121 MMHG | DIASTOLIC BLOOD PRESSURE: 82 MMHG

## 2023-10-18 DIAGNOSIS — N89.8 PROBLEMATIC VAGINAL DISCHARGE: Primary | ICD-10-CM

## 2023-10-18 PROCEDURE — 99213 OFFICE O/P EST LOW 20 MIN: CPT | Mod: S$GLB,,, | Performed by: STUDENT IN AN ORGANIZED HEALTH CARE EDUCATION/TRAINING PROGRAM

## 2023-10-18 PROCEDURE — 3074F PR MOST RECENT SYSTOLIC BLOOD PRESSURE < 130 MM HG: ICD-10-PCS | Mod: CPTII,S$GLB,, | Performed by: STUDENT IN AN ORGANIZED HEALTH CARE EDUCATION/TRAINING PROGRAM

## 2023-10-18 PROCEDURE — 3074F SYST BP LT 130 MM HG: CPT | Mod: CPTII,S$GLB,, | Performed by: STUDENT IN AN ORGANIZED HEALTH CARE EDUCATION/TRAINING PROGRAM

## 2023-10-18 PROCEDURE — 99999 PR PBB SHADOW E&M-EST. PATIENT-LVL III: ICD-10-PCS | Mod: PBBFAC,,, | Performed by: STUDENT IN AN ORGANIZED HEALTH CARE EDUCATION/TRAINING PROGRAM

## 2023-10-18 PROCEDURE — 87491 CHLMYD TRACH DNA AMP PROBE: CPT | Performed by: STUDENT IN AN ORGANIZED HEALTH CARE EDUCATION/TRAINING PROGRAM

## 2023-10-18 PROCEDURE — 1159F MED LIST DOCD IN RCRD: CPT | Mod: CPTII,S$GLB,, | Performed by: STUDENT IN AN ORGANIZED HEALTH CARE EDUCATION/TRAINING PROGRAM

## 2023-10-18 PROCEDURE — 99213 PR OFFICE/OUTPT VISIT, EST, LEVL III, 20-29 MIN: ICD-10-PCS | Mod: S$GLB,,, | Performed by: STUDENT IN AN ORGANIZED HEALTH CARE EDUCATION/TRAINING PROGRAM

## 2023-10-18 PROCEDURE — 3079F DIAST BP 80-89 MM HG: CPT | Mod: CPTII,S$GLB,, | Performed by: STUDENT IN AN ORGANIZED HEALTH CARE EDUCATION/TRAINING PROGRAM

## 2023-10-18 PROCEDURE — 1159F PR MEDICATION LIST DOCUMENTED IN MEDICAL RECORD: ICD-10-PCS | Mod: CPTII,S$GLB,, | Performed by: STUDENT IN AN ORGANIZED HEALTH CARE EDUCATION/TRAINING PROGRAM

## 2023-10-18 PROCEDURE — 1160F RVW MEDS BY RX/DR IN RCRD: CPT | Mod: CPTII,S$GLB,, | Performed by: STUDENT IN AN ORGANIZED HEALTH CARE EDUCATION/TRAINING PROGRAM

## 2023-10-18 PROCEDURE — 3079F PR MOST RECENT DIASTOLIC BLOOD PRESSURE 80-89 MM HG: ICD-10-PCS | Mod: CPTII,S$GLB,, | Performed by: STUDENT IN AN ORGANIZED HEALTH CARE EDUCATION/TRAINING PROGRAM

## 2023-10-18 PROCEDURE — 99999 PR PBB SHADOW E&M-EST. PATIENT-LVL III: CPT | Mod: PBBFAC,,, | Performed by: STUDENT IN AN ORGANIZED HEALTH CARE EDUCATION/TRAINING PROGRAM

## 2023-10-18 PROCEDURE — 1160F PR REVIEW ALL MEDS BY PRESCRIBER/CLIN PHARMACIST DOCUMENTED: ICD-10-PCS | Mod: CPTII,S$GLB,, | Performed by: STUDENT IN AN ORGANIZED HEALTH CARE EDUCATION/TRAINING PROGRAM

## 2023-10-18 PROCEDURE — 3008F BODY MASS INDEX DOCD: CPT | Mod: CPTII,S$GLB,, | Performed by: STUDENT IN AN ORGANIZED HEALTH CARE EDUCATION/TRAINING PROGRAM

## 2023-10-18 PROCEDURE — 3008F PR BODY MASS INDEX (BMI) DOCUMENTED: ICD-10-PCS | Mod: CPTII,S$GLB,, | Performed by: STUDENT IN AN ORGANIZED HEALTH CARE EDUCATION/TRAINING PROGRAM

## 2023-10-18 PROCEDURE — 81514 NFCT DS BV&VAGINITIS DNA ALG: CPT | Performed by: STUDENT IN AN ORGANIZED HEALTH CARE EDUCATION/TRAINING PROGRAM

## 2023-10-18 RX ORDER — METRONIDAZOLE 7.5 MG/G
1 GEL VAGINAL NIGHTLY
Qty: 70 G | Refills: 0 | Status: SHIPPED | OUTPATIENT
Start: 2023-10-18 | End: 2023-10-19 | Stop reason: SDUPTHER

## 2023-10-18 NOTE — PROGRESS NOTES
Subjective:      Patient ID: Faby Luz is a 37 y.o. female.    Chief Complaint:  Vaginitis    History of Present Illness  37  presents for evaluation for vaginal discharge. States this feels like previous bacterial vaginosis infections and has been bothering her for several days. Reports no douching, is using cotton underwear, and unscented plain soaps. Does not occur with intercourse but thinks it's been more common since Mirena was placed.        GYN & OB History  Patient's last menstrual period was 2023 (approximate).   Date of Last Pap: 2021    OB History    Para Term  AB Living   4 3 3   1 3   SAB IAB Ectopic Multiple Live Births           3      # Outcome Date GA Lbr Andry/2nd Weight Sex Delivery Anes PTL Lv   4 AB 19           3 Term 10/02/18 40w0d  3.629 kg (8 lb) M Vag-Spont EPI N JEANNETTE   2 Term 06/25/10 40w0d  3.459 kg (7 lb 10 oz) M Vag-Spont EPI N JEANNETTE   1 Term 07 40w0d  3.515 kg (7 lb 12 oz) M Vag-Spont  N JEANNETTE       Review of Systems  Review of Systems   Genitourinary:  Positive for vaginal discharge.   All other systems reviewed and are negative.         Objective:     Physical Exam:   Constitutional: She is oriented to person, place, and time. She appears well-developed and well-nourished.    HENT:   Head: Normocephalic and atraumatic.    Eyes: Conjunctivae and EOM are normal.      Pulmonary/Chest: Effort normal.          Genitourinary:    Pelvic exam was performed with patient in the lithotomy position.   The external female genitalia was normal.   Genitalia hair distrobution normal .   There is vaginal discharge in the vagina. IUD strings visualized.          Musculoskeletal: Normal range of motion.       Neurological: She is alert and oriented to person, place, and time.    Skin: Skin is warm.    Psychiatric: She has a normal mood and affect.      Vitals:    10/18/23 1115   BP: 121/82   BP Location: Right arm   Patient Position: Sitting   Weight: 91.4 kg  "(201 lb 8 oz)   Height: 5' 6" (1.676 m)         Assessment:     1. Problematic vaginal discharge         Plan:     1. Problematic vaginal discharge  - Swab collected, will send metrogel to pharmacy while waiting results. Oral flagyl is not effective for her  - Will follow up via patient portal to discuss possible suppressive treatment   - Happy with IUD from contraception standpoint and would like to keep in place     - C. trachomatis/N. gonorrhoeae by AMP DNA [facility]; Cervix  - Vaginosis Screen by DNA Probe  - metroNIDAZOLE (METROGEL) 0.75 % (37.5mg/5 gram) vaginal gel; Place 1 applicator vaginally every evening. for 5 days  Dispense: 70 g; Refill: 0      Return to clinic in April for annual or sooner PRN    Adrian Muñoz III, MD  [facility] - OB GYN   [address]  [place] [id number] [phone]     "

## 2023-10-19 ENCOUNTER — TELEPHONE (OUTPATIENT)
Dept: OBSTETRICS AND GYNECOLOGY | Facility: CLINIC | Age: 38
End: 2023-10-19
Payer: COMMERCIAL

## 2023-10-19 DIAGNOSIS — N89.8 PROBLEMATIC VAGINAL DISCHARGE: ICD-10-CM

## 2023-10-19 RX ORDER — METRONIDAZOLE 7.5 MG/G
1 GEL VAGINAL NIGHTLY
Qty: 70 G | Refills: 0 | Status: SHIPPED | OUTPATIENT
Start: 2023-10-19 | End: 2023-10-24

## 2023-10-19 NOTE — TELEPHONE ENCOUNTER
I attempted to contact patient in re: to rx update. I left a detailed message that provider resent the medication to the other [facility] per her request. Left cb info for any follow up questions.    EDGAR Willoughby  [phone]    ----- Message from Adrian Muñoz III, MD sent at 10/19/2023 11:28 AM CDT -----  Regarding: RE: Self/  [phone]  Sent to the [facility] on [address] in [place]... not sure if it's the  super center or not     ----- Message -----  From: Fartun Dietrcih MA  Sent: 10/19/2023  11:18 AM CDT  To: Adrian Muñoz III, MD  Subject: FW: Self/  [phone]                          Hey!    Patient requesting to have her medication sent to the [facility] on [address]. Let me know when it's done and I'll call the patient back. Many thanks  ----- Message -----  From: Caren Martinez  Sent: 10/18/2023  12:55 PM CDT  To: Toni Campbell Staff  Subject: Self/  [phone]                              Type: Patient Call Back    Who called:  Patient    What is the request in detail:  [facility] is closed for 10 to 14 days prescription will need to called into [facility] on [address].  Please send ASAP.  Thank you    Would the patient rather a call back or a response via [facility]?   Call back    Best call back number:  [phone]      Thank you

## 2023-10-20 LAB
BACTERIAL VAGINOSIS DNA: NEGATIVE
C TRACH DNA SPEC QL NAA+PROBE: NOT DETECTED
CANDIDA GLABRATA DNA: NEGATIVE
CANDIDA KRUSEI DNA: NEGATIVE
CANDIDA RRNA VAG QL PROBE: NEGATIVE
N GONORRHOEA DNA SPEC QL NAA+PROBE: NOT DETECTED
T VAGINALIS RRNA GENITAL QL PROBE: NEGATIVE

## 2024-01-18 ENCOUNTER — OFFICE VISIT (OUTPATIENT)
Dept: OBSTETRICS AND GYNECOLOGY | Facility: CLINIC | Age: 39
End: 2024-01-18
Payer: COMMERCIAL

## 2024-01-18 VITALS
DIASTOLIC BLOOD PRESSURE: 72 MMHG | SYSTOLIC BLOOD PRESSURE: 115 MMHG | BODY MASS INDEX: 32.88 KG/M2 | HEIGHT: 66 IN | WEIGHT: 204.56 LBS

## 2024-01-18 DIAGNOSIS — N76.0 BACTERIAL VAGINOSIS: Primary | ICD-10-CM

## 2024-01-18 DIAGNOSIS — B96.89 BACTERIAL VAGINOSIS: Primary | ICD-10-CM

## 2024-01-18 PROCEDURE — 3078F DIAST BP <80 MM HG: CPT | Mod: CPTII,S$GLB,, | Performed by: STUDENT IN AN ORGANIZED HEALTH CARE EDUCATION/TRAINING PROGRAM

## 2024-01-18 PROCEDURE — 99999 PR PBB SHADOW E&M-EST. PATIENT-LVL III: CPT | Mod: PBBFAC,,, | Performed by: STUDENT IN AN ORGANIZED HEALTH CARE EDUCATION/TRAINING PROGRAM

## 2024-01-18 PROCEDURE — 81514 NFCT DS BV&VAGINITIS DNA ALG: CPT | Performed by: STUDENT IN AN ORGANIZED HEALTH CARE EDUCATION/TRAINING PROGRAM

## 2024-01-18 PROCEDURE — 99213 OFFICE O/P EST LOW 20 MIN: CPT | Mod: S$GLB,,, | Performed by: STUDENT IN AN ORGANIZED HEALTH CARE EDUCATION/TRAINING PROGRAM

## 2024-01-18 PROCEDURE — 3008F BODY MASS INDEX DOCD: CPT | Mod: CPTII,S$GLB,, | Performed by: STUDENT IN AN ORGANIZED HEALTH CARE EDUCATION/TRAINING PROGRAM

## 2024-01-18 PROCEDURE — 1159F MED LIST DOCD IN RCRD: CPT | Mod: CPTII,S$GLB,, | Performed by: STUDENT IN AN ORGANIZED HEALTH CARE EDUCATION/TRAINING PROGRAM

## 2024-01-18 PROCEDURE — 3074F SYST BP LT 130 MM HG: CPT | Mod: CPTII,S$GLB,, | Performed by: STUDENT IN AN ORGANIZED HEALTH CARE EDUCATION/TRAINING PROGRAM

## 2024-01-18 PROCEDURE — 1160F RVW MEDS BY RX/DR IN RCRD: CPT | Mod: CPTII,S$GLB,, | Performed by: STUDENT IN AN ORGANIZED HEALTH CARE EDUCATION/TRAINING PROGRAM

## 2024-01-18 RX ORDER — METRONIDAZOLE 7.5 MG/G
5 GEL VAGINAL NIGHTLY
Qty: 70 G | Refills: 2 | Status: SHIPPED | OUTPATIENT
Start: 2024-01-18

## 2024-01-18 NOTE — PROGRESS NOTES
Subjective:      Patient ID: Faby Luz is a 38 y.o. female.    Chief Complaint:  Vaginal Discharge (She c/o constant BV. She feels like nothing is working.)      History of Present Illness  38  presents to discuss recurrent BV    Reports after IUD was replaced has had consistent vaginitis symptoms twice a month  Has tried metrogel which gives short term relief. Boric acid alone has been tried as well with some relief. Flagyl pills have not worked          GYN & OB History  Patient's last menstrual period was 2024 (approximate).   Date of Last Pap: 2021    OB History    Para Term  AB Living   4 3 3   1 3   SAB IAB Ectopic Multiple Live Births           3      # Outcome Date GA Lbr Andry/2nd Weight Sex Delivery Anes PTL Lv   4 AB 19           3 Term 10/02/18 40w0d  3.629 kg (8 lb) M Vag-Spont EPI N JEANNETTE   2 Term 06/25/10 40w0d  3.459 kg (7 lb 10 oz) M Vag-Spont EPI N JEANNETTE   1 Term 07 40w0d  3.515 kg (7 lb 12 oz) M Vag-Spont  N JEANNETTE       Review of Systems  Review of Systems   Genitourinary:  Positive for vaginal discharge.   All other systems reviewed and are negative.         Objective:     Physical Exam:   Constitutional: She appears well-developed and well-nourished.    HENT:   Head: Normocephalic and atraumatic.    Eyes: EOM are normal.      Pulmonary/Chest: Effort normal.        Abdominal: Soft.     Genitourinary:    Uterus, right adnexa and left adnexa normal.      Pelvic exam was performed with patient in the lithotomy position.   The external female genitalia was normal.   Genitalia hair distrobution normal .   There is no lesion on the right labia. There is no lesion on the left labia. Cervix is normal. There is vaginal discharge (odor) and bleeding in the vagina.    Genitourinary Comments: Chaperone present for duration of exam             Musculoskeletal: Normal range of motion.       Neurological: She is alert.    Skin: Skin is warm and dry.    Psychiatric: She has  a normal mood and affect.         Assessment:     1. Bacterial vaginosis       Plan:     1. Bacterial vaginosis  - Exam consistent with BV, discussed treating acute infection and then starting suppressive metrogel treatment 2x weekly +/- boric acid  - Will remove IUD and discuss tubal if no improvement   - metroNIDAZOLE (METROGEL) 0.75 % (37.5mg/5 gram) vaginal gel; Place 5 applicators vaginally every evening.  Dispense: 70 g; Refill: 2      Follow up in 2 months with PA virtual visit or in office    Adrian Muñoz III, MD  [facility] [place] [place] - OB GYN   [address]  [place] [id number] [phone]

## 2024-01-22 ENCOUNTER — TELEPHONE (OUTPATIENT)
Dept: OBSTETRICS AND GYNECOLOGY | Facility: CLINIC | Age: 39
End: 2024-01-22
Payer: COMMERCIAL

## 2024-01-22 NOTE — TELEPHONE ENCOUNTER
Spoke with pharmacy and she stated pt picked up medication on the 18 of Jan. Pt voiced she knows not to use all 5 applicators at one time.            ----- Message from Adrian Muñoz III, MD sent at 1/19/2024  8:02 AM CST -----  It should read  place one applicator in the vagina nightly for 5 nights  ----- Message -----  From: Yulisa Montez MA  Sent: 1/18/2024   1:00 PM CST  To: Adrian Muñoz III, MD    Please advise....  ----- Message -----  From: Rola Gupta  Sent: 1/18/2024  11:10 AM CST  To: Toni Campbell Staff    Type: RX Refill Request    Who Called: [facility] [phone]    Have you contacted your pharmacy:    Refill or New Rx:metroNIDAZOLE (METROGEL) 0.75 % (37.5mg/5 gram) vaginal gel   - call pharmacy to confirm directions. Call pharmacy    RX Name and Strength:    How is the patient currently taking it? (ex. 1XDay):    Is this a 30 day or 90 day RX:    Preferred Pharmacy with phone number:    Local or Mail Order:    Ordering Provider:    Would the patient rather a call back or a response via [facility]?     Best Call Back Number:    Additional Information:

## 2024-01-23 ENCOUNTER — PATIENT MESSAGE (OUTPATIENT)
Dept: OBSTETRICS AND GYNECOLOGY | Facility: CLINIC | Age: 39
End: 2024-01-23
Payer: COMMERCIAL

## 2024-10-14 ENCOUNTER — OFFICE VISIT (OUTPATIENT)
Dept: OBSTETRICS AND GYNECOLOGY | Facility: CLINIC | Age: 39
End: 2024-10-14
Payer: COMMERCIAL

## 2024-10-14 VITALS
SYSTOLIC BLOOD PRESSURE: 114 MMHG | HEIGHT: 66 IN | WEIGHT: 209.13 LBS | DIASTOLIC BLOOD PRESSURE: 70 MMHG | BODY MASS INDEX: 33.61 KG/M2

## 2024-10-14 DIAGNOSIS — Z30.09 CONSULTATION FOR FEMALE STERILIZATION: ICD-10-CM

## 2024-10-14 DIAGNOSIS — Z11.3 SCREEN FOR STD (SEXUALLY TRANSMITTED DISEASE): ICD-10-CM

## 2024-10-14 DIAGNOSIS — Z30.2 REQUEST FOR STERILIZATION: Primary | ICD-10-CM

## 2024-10-14 DIAGNOSIS — N76.0 ACUTE VULVOVAGINITIS: ICD-10-CM

## 2024-10-14 DIAGNOSIS — L98.9 SKIN LESION: Primary | ICD-10-CM

## 2024-10-14 PROCEDURE — 99999 PR PBB SHADOW E&M-EST. PATIENT-LVL IV: CPT | Mod: PBBFAC,,, | Performed by: STUDENT IN AN ORGANIZED HEALTH CARE EDUCATION/TRAINING PROGRAM

## 2024-10-14 PROCEDURE — 87491 CHLMYD TRACH DNA AMP PROBE: CPT | Performed by: STUDENT IN AN ORGANIZED HEALTH CARE EDUCATION/TRAINING PROGRAM

## 2024-10-14 PROCEDURE — 1160F RVW MEDS BY RX/DR IN RCRD: CPT | Mod: CPTII,S$GLB,, | Performed by: STUDENT IN AN ORGANIZED HEALTH CARE EDUCATION/TRAINING PROGRAM

## 2024-10-14 PROCEDURE — 87591 N.GONORRHOEAE DNA AMP PROB: CPT | Performed by: STUDENT IN AN ORGANIZED HEALTH CARE EDUCATION/TRAINING PROGRAM

## 2024-10-14 PROCEDURE — 1159F MED LIST DOCD IN RCRD: CPT | Mod: CPTII,S$GLB,, | Performed by: STUDENT IN AN ORGANIZED HEALTH CARE EDUCATION/TRAINING PROGRAM

## 2024-10-14 PROCEDURE — 3008F BODY MASS INDEX DOCD: CPT | Mod: CPTII,S$GLB,, | Performed by: STUDENT IN AN ORGANIZED HEALTH CARE EDUCATION/TRAINING PROGRAM

## 2024-10-14 PROCEDURE — 3074F SYST BP LT 130 MM HG: CPT | Mod: CPTII,S$GLB,, | Performed by: STUDENT IN AN ORGANIZED HEALTH CARE EDUCATION/TRAINING PROGRAM

## 2024-10-14 PROCEDURE — 0352U VAGINOSIS SCREEN BY DNA PROBE: CPT | Performed by: STUDENT IN AN ORGANIZED HEALTH CARE EDUCATION/TRAINING PROGRAM

## 2024-10-14 PROCEDURE — 3078F DIAST BP <80 MM HG: CPT | Mod: CPTII,S$GLB,, | Performed by: STUDENT IN AN ORGANIZED HEALTH CARE EDUCATION/TRAINING PROGRAM

## 2024-10-14 PROCEDURE — 99214 OFFICE O/P EST MOD 30 MIN: CPT | Mod: S$GLB,,, | Performed by: STUDENT IN AN ORGANIZED HEALTH CARE EDUCATION/TRAINING PROGRAM

## 2024-10-14 RX ORDER — DIPHENHYDRAMINE HYDROCHLORIDE 50 MG/ML
25 INJECTION INTRAMUSCULAR; INTRAVENOUS EVERY 4 HOURS PRN
OUTPATIENT
Start: 2024-10-14

## 2024-10-14 RX ORDER — DIPHENHYDRAMINE HCL 25 MG
25 CAPSULE ORAL EVERY 4 HOURS PRN
OUTPATIENT
Start: 2024-10-14

## 2024-10-14 RX ORDER — SODIUM CHLORIDE, SODIUM LACTATE, POTASSIUM CHLORIDE, CALCIUM CHLORIDE 600; 310; 30; 20 MG/100ML; MG/100ML; MG/100ML; MG/100ML
INJECTION, SOLUTION INTRAVENOUS CONTINUOUS
OUTPATIENT
Start: 2024-10-14

## 2024-10-14 RX ORDER — ACETAMINOPHEN 500 MG
1000 TABLET ORAL
OUTPATIENT
Start: 2024-10-14

## 2024-10-14 RX ORDER — MORPHINE SULFATE 4 MG/ML
3 INJECTION, SOLUTION INTRAMUSCULAR; INTRAVENOUS
OUTPATIENT
Start: 2024-10-14

## 2024-10-14 RX ORDER — ONDANSETRON 8 MG/1
8 TABLET, ORALLY DISINTEGRATING ORAL EVERY 8 HOURS PRN
OUTPATIENT
Start: 2024-10-14

## 2024-10-14 RX ORDER — HYDROMORPHONE HYDROCHLORIDE 1 MG/ML
1 INJECTION, SOLUTION INTRAMUSCULAR; INTRAVENOUS; SUBCUTANEOUS EVERY 6 HOURS PRN
OUTPATIENT
Start: 2024-10-14

## 2024-10-14 RX ORDER — HYDROCODONE BITARTRATE AND ACETAMINOPHEN 5; 325 MG/1; MG/1
1 TABLET ORAL EVERY 4 HOURS PRN
OUTPATIENT
Start: 2024-10-14

## 2024-10-14 RX ORDER — METOCLOPRAMIDE HYDROCHLORIDE 5 MG/ML
5 INJECTION INTRAMUSCULAR; INTRAVENOUS EVERY 6 HOURS PRN
OUTPATIENT
Start: 2024-10-14

## 2024-10-14 RX ORDER — IBUPROFEN 600 MG/1
600 TABLET ORAL EVERY 6 HOURS PRN
OUTPATIENT
Start: 2024-10-14

## 2024-10-14 RX ORDER — AMOXICILLIN 250 MG
1 CAPSULE ORAL 2 TIMES DAILY
OUTPATIENT
Start: 2024-10-14

## 2024-10-14 RX ORDER — METRONIDAZOLE 7.5 MG/G
1 GEL VAGINAL
Qty: 70 G | Refills: 3 | Status: SHIPPED | OUTPATIENT
Start: 2024-10-14

## 2024-10-14 RX ORDER — MUPIROCIN 20 MG/G
OINTMENT TOPICAL
OUTPATIENT
Start: 2024-10-14

## 2024-10-14 RX ORDER — FAMOTIDINE 20 MG/1
20 TABLET, FILM COATED ORAL
OUTPATIENT
Start: 2024-10-14

## 2024-10-14 NOTE — PROGRESS NOTES
Subjective     Patient ID: Faby Luz is a 38 y.o. female.    Chief Complaint:  Gynecologic Exam (BV)      History of Present Illness  39 yo  presents for vaginitis sx    Reports return of BV symptoms, she gets BV flare ups about every 3 months  Was prescribed metrogel for suppressive treatment but pharmacy was unable to dispense  Symptoms are primarily fishy odor.  Interested in pursuing IUD removal and salpingectomy for birth control         GYN & OB History  Patient's last menstrual period was 10/02/2024.   Date of Last Pap: 2021    OB History    Para Term  AB Living   4 3 3   1 3   SAB IAB Ectopic Multiple Live Births           3      # Outcome Date GA Lbr Andry/2nd Weight Sex Type Anes PTL Lv   4 AB 19           3 Term 10/02/18 40w0d  3.629 kg (8 lb) M Vag-Spont EPI N JEANNETTE   2 Term 06/25/10 40w0d  3.459 kg (7 lb 10 oz) M Vag-Spont EPI N JEANNETTE   1 Term 07 40w0d  3.515 kg (7 lb 12 oz) M Vag-Spont  N JEANNETTE       Review of Systems  Review of Systems   Genitourinary:  Positive for vaginal discharge and vaginal odor.   All other systems reviewed and are negative.         Objective   Physical Exam:   Constitutional: She appears well-developed and well-nourished.    HENT:   Head: Normocephalic and atraumatic.    Eyes: EOM are normal.      Pulmonary/Chest: Effort normal.        Abdominal: Soft.     Genitourinary:    Uterus, right adnexa and left adnexa normal.      Pelvic exam was performed with patient in the lithotomy position.   The external female genitalia was normal.   Genitalia hair distrobution normal .   There is no lesion on the right labia. There is no lesion on the left labia. Cervix is normal. There is vaginal discharge (odor) in the vagina.    Genitourinary Comments: Female chaperone present for duration of exam             Musculoskeletal: Normal range of motion.       Neurological: She is alert.    Skin: Skin is warm and dry.    Psychiatric: She has a normal mood and  affect.            Assessment and Plan     1. Acute vulvovaginitis    2. Screen for STD (sexually transmitted disease)    3. Consultation for female sterilization          Plan:  1. Acute vulvovaginitis (Primary)  - metroNIDAZOLE (METROGEL) 0.75 % (37.5mg/5 gram) vaginal gel; Place 1 applicator vaginally twice a week.  Dispense: 70 g; Refill: 3  - Vaginosis Screen by DNA Probe    2. Screen for STD (sexually transmitted disease)  - C. trachomatis/N. gonorrhoeae by AMP DNA [facility]; Cervicovaginal    3. Consultation for female sterilization  - discussed r/b/a of permanent sterilization including inability to reverse, also lower risk of failure and ectopic pregnancy. Pt is certain she has finished childbearing  - Discussed possibly [facility]SC only if IUD is not easily removed while under anesthesia  - reviewed post-op expectations and recovery time, pt stands and walks at work but does not lift. Recommend 1-2 weeks off depending on how quickly she recovers. Reviewed medication list    - Case Request Operating Room: SALPINGECTOMY, LAPAROSCOPIC, HYSTEROSCOPY. WITH IMPACTED FOREIGN BODY REMOVAL  - CBC auto differential; Future  - POCT urine pregnancy  - Type & Screen; Future      Adrian Muñoz III, MD  [facility] - [place] - OB GYN   [address]  [place] [address] [phone]

## 2024-10-14 NOTE — H&P
[facility] OB GYN  Obstetrics & Gynecology  History & Physical    Patient Name: Faby Luz  MRN: [id number]  Admission Date: (Not on file)  Primary Care Provider: Chantal, Primary Doctor    Subjective:     Chief Complaint/Reason for Admission:   Desire for permanent sterilization    History of Present Illness:   37 yo  presents for scheduled IUD removal and laparoscopic bilateral salpingectomy. Pt desires permanent contraception, has completed childbearing and understands that this is an irreversible procedure.    Current Outpatient Medications on File Prior to Visit   Medication Sig    levonorgestreL (MIRENA) 20 mcg/24 hours (7 yrs) 52 mg IUD 1 Intra Uterine Device by Intrauterine route once. for 1 dose    [DISCONTINUED] fluconazole (DIFLUCAN) 150 MG Tab Take 1 tablet (150 mg total) by mouth as needed. Take one pill weekly as needed for yeast infection. (Patient not taking: Reported on 10/14/2024)    [DISCONTINUED] metroNIDAZOLE (METROGEL) 0.75 % (37.5mg/5 gram) vaginal gel Place 5 applicators vaginally every evening. (Patient not taking: Reported on 10/14/2024)    [DISCONTINUED] phentermine (ADIPEX-P) 37.5 mg tablet Take 37.5 mg by mouth once daily. (Patient not taking: Reported on 10/14/2024)     Current Facility-Administered Medications on File Prior to Visit   Medication    levonorgestrel 20 mcg/24 hours (5 yrs) 52 mg IUD 1 Intra Uterine Device       Review of patient's allergies indicates:  No Known Allergies    History reviewed. No pertinent past medical history.  OB History    Para Term  AB Living   4 3 3   1 3   SAB IAB Ectopic Multiple Live Births           3      # Outcome Date GA Lbr Andry/2nd Weight Sex Type Anes PTL Lv   4 AB 19           3 Term 10/02/18 40w0d  3.629 kg (8 lb) M Vag-Spont EPI N JEANNETTE   2 Term 06/25/10 40w0d  3.459 kg (7 lb 10 oz) M Vag-Spont EPI N JEANNETTE   1 Term 07 40w0d  3.515 kg (7 lb 12 oz) M Vag-Spont  N JEANNETTE     History reviewed. No pertinent surgical  history.  Family History       Problem Relation (Age of Onset)    Diabetes Mother, Maternal Grandmother, Maternal Aunt    Hypertension Maternal Grandmother    Kidney disease Maternal Aunt          Tobacco Use    Smoking status: Never    Smokeless tobacco: Never   Substance and Sexual Activity    Alcohol use: Yes     Comment: occ-social    Drug use: No    Sexual activity: Yes     Birth control/protection: None     Review of Systems   All other systems reviewed and are negative.    Objective:     Vital Signs (Most Recent):  BP: 114/70 (10/14/24 0915) Vital Signs (24h Range):  [unfilled]     Weight: 94.8 kg (209 lb 1.7 oz)  Body mass index is 33.75 kg/m².  Patient's last menstrual period was 10/02/2024.    Physical Exam:   Constitutional: She is oriented to person, place, and time. She appears well-developed and well-nourished.    HENT:   Head: Normocephalic and atraumatic.    Eyes: Conjunctivae and EOM are normal.      Pulmonary/Chest: Effort normal.                  Musculoskeletal: Normal range of motion.       Neurological: She is alert and oriented to person, place, and time.    Skin: Skin is warm and dry.    Psychiatric: She has a normal mood and affect.       Laboratory:  I have personallly reviewed all pertinent lab results from the last 24 hours.    Diagnostic Results:  Labs: Reviewed    Assessment/Plan:     There are no hospital problems to display for this patient.    39 yo  presents for scheduled laparoscopic bilateral salpingectomy and intrauterine device removal, possible hysteroscopy, desires permanent sterilizaton   - Reviewed H&P with patient and updated accordingly at pre-op appointment  - Discussed options and pt desire permanent sterilization, was given opportunity to ask questions about alternatives and about the procedure.   - Informed consent obtained, surgical consents scanned and in chart  - [facility] coverage, no medicaid form required   - Anticipate discharge home after surgery, will follow  up in office within 2 weeks      Adrian Muñoz III, MD  Obstetrics & Gynecology  [facility] [place] - OB GYN

## 2024-10-16 LAB
BACTERIAL VAGINOSIS DNA: DETECTED
C TRACH DNA SPEC QL NAA+PROBE: NOT DETECTED
CANDIDA GLABRATA/KRUSEI: NOT DETECTED
CANDIDA RRNA VAG QL PROBE: NOT DETECTED
N GONORRHOEA DNA SPEC QL NAA+PROBE: NOT DETECTED
TRICHOMONAS VAGINALIS: NOT DETECTED

## 2024-12-24 ENCOUNTER — TELEPHONE (OUTPATIENT)
Dept: PREADMISSION TESTING | Facility: HOSPITAL | Age: 39
End: 2024-12-24
Payer: COMMERCIAL

## 2024-12-26 ENCOUNTER — PATIENT MESSAGE (OUTPATIENT)
Dept: OBSTETRICS AND GYNECOLOGY | Facility: CLINIC | Age: 39
End: 2024-12-26
Payer: COMMERCIAL

## 2025-08-07 ENCOUNTER — PATIENT MESSAGE (OUTPATIENT)
Dept: OBSTETRICS AND GYNECOLOGY | Facility: CLINIC | Age: 40
End: 2025-08-07
Payer: COMMERCIAL

## 2025-08-07 DIAGNOSIS — N76.0 ACUTE VULVOVAGINITIS: ICD-10-CM

## 2025-08-08 RX ORDER — METRONIDAZOLE 7.5 MG/G
1 GEL VAGINAL
Qty: 70 G | Refills: 3 | Status: SHIPPED | OUTPATIENT
Start: 2025-08-11

## 2025-08-14 ENCOUNTER — TELEPHONE (OUTPATIENT)
Dept: OBSTETRICS AND GYNECOLOGY | Facility: CLINIC | Age: 40
End: 2025-08-14
Payer: COMMERCIAL